# Patient Record
Sex: MALE | Race: WHITE | NOT HISPANIC OR LATINO
[De-identification: names, ages, dates, MRNs, and addresses within clinical notes are randomized per-mention and may not be internally consistent; named-entity substitution may affect disease eponyms.]

---

## 2019-03-26 ENCOUNTER — TRANSCRIPTION ENCOUNTER (OUTPATIENT)
Age: 74
End: 2019-03-26

## 2021-07-02 ENCOUNTER — TRANSCRIPTION ENCOUNTER (OUTPATIENT)
Age: 76
End: 2021-07-02

## 2023-10-16 ENCOUNTER — APPOINTMENT (OUTPATIENT)
Dept: UROLOGY | Facility: CLINIC | Age: 78
End: 2023-10-16
Payer: MEDICARE

## 2023-10-16 DIAGNOSIS — Z86.39 PERSONAL HISTORY OF OTHER ENDOCRINE, NUTRITIONAL AND METABOLIC DISEASE: ICD-10-CM

## 2023-10-16 DIAGNOSIS — Z87.891 PERSONAL HISTORY OF NICOTINE DEPENDENCE: ICD-10-CM

## 2023-10-16 DIAGNOSIS — Z78.9 OTHER SPECIFIED HEALTH STATUS: ICD-10-CM

## 2023-10-16 DIAGNOSIS — Z86.79 PERSONAL HISTORY OF OTHER DISEASES OF THE CIRCULATORY SYSTEM: ICD-10-CM

## 2023-10-16 PROCEDURE — 99203 OFFICE O/P NEW LOW 30 MIN: CPT

## 2023-10-20 ENCOUNTER — APPOINTMENT (OUTPATIENT)
Dept: MRI IMAGING | Facility: IMAGING CENTER | Age: 78
End: 2023-10-20
Payer: MEDICARE

## 2023-10-20 ENCOUNTER — RESULT REVIEW (OUTPATIENT)
Age: 78
End: 2023-10-20

## 2023-10-20 ENCOUNTER — OUTPATIENT (OUTPATIENT)
Dept: OUTPATIENT SERVICES | Facility: HOSPITAL | Age: 78
LOS: 1 days | End: 2023-10-20
Payer: MEDICARE

## 2023-10-20 DIAGNOSIS — M65.30 TRIGGER FINGER, UNSPECIFIED FINGER: Chronic | ICD-10-CM

## 2023-10-20 DIAGNOSIS — Q30.8 OTHER CONGENITAL MALFORMATIONS OF NOSE: Chronic | ICD-10-CM

## 2023-10-20 DIAGNOSIS — Z98.890 OTHER SPECIFIED POSTPROCEDURAL STATES: Chronic | ICD-10-CM

## 2023-10-20 DIAGNOSIS — R97.20 ELEVATED PROSTATE SPECIFIC ANTIGEN [PSA]: ICD-10-CM

## 2023-10-20 PROCEDURE — A9585: CPT

## 2023-10-20 PROCEDURE — 72197 MRI PELVIS W/O & W/DYE: CPT

## 2023-10-20 PROCEDURE — 76498P: CUSTOM | Mod: 26,MH

## 2023-10-20 PROCEDURE — 76498 UNLISTED MR PROCEDURE: CPT

## 2023-10-20 PROCEDURE — 72197 MRI PELVIS W/O & W/DYE: CPT | Mod: 26,MH

## 2024-01-11 ENCOUNTER — APPOINTMENT (OUTPATIENT)
Dept: SURGERY | Facility: CLINIC | Age: 79
End: 2024-01-11
Payer: MEDICARE

## 2024-01-11 VITALS
WEIGHT: 180 LBS | DIASTOLIC BLOOD PRESSURE: 79 MMHG | SYSTOLIC BLOOD PRESSURE: 153 MMHG | OXYGEN SATURATION: 98 % | BODY MASS INDEX: 24.38 KG/M2 | HEART RATE: 77 BPM | HEIGHT: 72 IN | TEMPERATURE: 97.2 F | RESPIRATION RATE: 17 BRPM

## 2024-01-11 DIAGNOSIS — D12.4 BENIGN NEOPLASM OF DESCENDING COLON: ICD-10-CM

## 2024-01-11 PROCEDURE — 99203 OFFICE O/P NEW LOW 30 MIN: CPT

## 2024-01-11 RX ORDER — ASPIRIN 81 MG
81 TABLET, DELAYED RELEASE (ENTERIC COATED) ORAL
Refills: 0 | Status: ACTIVE | COMMUNITY

## 2024-01-11 RX ORDER — IPRATROPIUM BROMIDE 21 UG/1
0.03 SPRAY NASAL
Refills: 0 | Status: ACTIVE | COMMUNITY

## 2024-01-11 RX ORDER — CETIRIZINE HYDROCHLORIDE 10 MG/1
CAPSULE, LIQUID FILLED ORAL
Refills: 0 | Status: ACTIVE | COMMUNITY

## 2024-01-11 RX ORDER — AMLODIPINE BESYLATE AND BENAZEPRIL HYDROCHLORIDE 10; 20 MG/1; MG/1
10-20 CAPSULE ORAL
Refills: 0 | Status: ACTIVE | COMMUNITY

## 2024-01-11 RX ORDER — ROSUVASTATIN CALCIUM 5 MG/1
TABLET, FILM COATED ORAL
Refills: 0 | Status: ACTIVE | COMMUNITY

## 2024-01-11 RX ORDER — VARDENAFIL 20 MG/1
20 TABLET, FILM COATED ORAL
Refills: 0 | Status: ACTIVE | COMMUNITY

## 2024-01-11 RX ORDER — EMPAGLIFLOZIN 25 MG/1
TABLET, FILM COATED ORAL
Refills: 0 | Status: ACTIVE | COMMUNITY

## 2024-01-11 RX ORDER — METFORMIN HYDROCHLORIDE 625 MG/1
TABLET ORAL
Refills: 0 | Status: ACTIVE | COMMUNITY

## 2024-01-11 RX ORDER — LEVOTHYROXINE SODIUM 0.17 MG/1
TABLET ORAL
Refills: 0 | Status: ACTIVE | COMMUNITY

## 2024-01-11 RX ORDER — METOPROLOL SUCCINATE 25 MG/1
25 TABLET, EXTENDED RELEASE ORAL
Refills: 0 | Status: ACTIVE | COMMUNITY

## 2024-01-11 NOTE — PHYSICAL EXAM
[Normal Breath Sounds] : Normal breath sounds [Normal Heart Sounds] : normal heart sounds [Alert] : alert [Oriented to Person] : oriented to person [Oriented to Place] : oriented to place [Oriented to Time] : oriented to time [Calm] : calm [Abdomen Masses] : No abdominal masses [Abdomen Tenderness] : ~T No ~M abdominal tenderness [de-identified] : WNL [de-identified] : WNL [de-identified] : RETAL [de-identified] : WNL ROM [de-identified] : WNL

## 2024-01-11 NOTE — HISTORY OF PRESENT ILLNESS
[FreeTextEntry1] : Jovan is a 77 y/o male here for a consultation visit, colon polyp.   Colonoscopy on 12/18/23 - Internal hemorrhoids. One 5 mm polyp in the rectum, removed with a cold snare. Resected and retrieved. One 5 mm polyp in the rectum, removed with a cold snare. Rescted and retrieved. One 12 mm polyp in the mid descending colon. Biopsied. Tattooed.  Pathology - 1. Descending colon, polyp, polypectomy: tubular adenoma. 2.  Descending colon, polyp 2, polypectomy: tubular adenoma. 3. Rectum, polyp, polypectomy: tubular adenoma.  Last seen more than 20 years ago, s/p transanal excision of polyp   Today pt reports no pain. Daily BMs, formed, denies pain, no bleeding, no episodes of incontinence, and denies feeling swollen or prolapsed tissue. Takes baby aspirin, hx of stents x5

## 2024-01-11 NOTE — CONSULT LETTER
[Dear  ___] : Dear ~CHRISTINE, [Courtesy Letter:] : I had the pleasure of seeing your patient, [unfilled], in my office today. [Please see my note below.] : Please see my note below. [Consult Closing:] : Thank you very much for allowing me to participate in the care of this patient.  If you have any questions, please do not hesitate to contact me. [Sincerely,] : Sincerely, [FreeTextEntry2] : Dr. Kirill Frederick [FreeTextEntry3] : Logan Pearce M.D., F.DESIRE.C.S., F.A.S.C.R.S. Chief Colorectal Clinical Services, [facility] [DrKwasi  ___] : Dr. FRIAS

## 2024-01-11 NOTE — ASSESSMENT
[FreeTextEntry1] : I have seen and evaluated patient and I have corroborated all nursing input into this note.  Patient with a flat polyp of the descending colon requiring endoscopic mucosal resection.  I will make arrangements for the procedure at [facility].  Indications, risks, benefits, alternatives reviewed including but not limited to bleeding, perforation, and inability to remove the polyp.  All questions were answered.

## 2024-01-17 DIAGNOSIS — Z12.11 ENCOUNTER FOR SCREENING FOR MALIGNANT NEOPLASM OF COLON: ICD-10-CM

## 2024-01-17 RX ORDER — SODIUM PICOSULFATE, MAGNESIUM OXIDE, AND ANHYDROUS CITRIC ACID 12; 3.5; 1 G/175ML; G/175ML; MG/175ML
10-3.5-12 MG-GM LIQUID ORAL
Qty: 1 | Refills: 0 | Status: ACTIVE | COMMUNITY
Start: 2024-01-17 | End: 1900-01-01

## 2024-02-05 ENCOUNTER — OUTPATIENT (OUTPATIENT)
Dept: OUTPATIENT SERVICES | Facility: HOSPITAL | Age: 79
LOS: 1 days | End: 2024-02-05
Payer: MEDICARE

## 2024-02-05 ENCOUNTER — APPOINTMENT (OUTPATIENT)
Dept: SURGERY | Facility: HOSPITAL | Age: 79
End: 2024-02-05
Payer: MEDICARE

## 2024-02-05 ENCOUNTER — TRANSCRIPTION ENCOUNTER (OUTPATIENT)
Age: 79
End: 2024-02-05

## 2024-02-05 ENCOUNTER — RESULT REVIEW (OUTPATIENT)
Age: 79
End: 2024-02-05

## 2024-02-05 VITALS
HEART RATE: 60 BPM | OXYGEN SATURATION: 99 % | DIASTOLIC BLOOD PRESSURE: 60 MMHG | SYSTOLIC BLOOD PRESSURE: 122 MMHG | RESPIRATION RATE: 17 BRPM

## 2024-02-05 VITALS
HEIGHT: 72 IN | OXYGEN SATURATION: 99 % | WEIGHT: 182.1 LBS | TEMPERATURE: 98 F | RESPIRATION RATE: 20 BRPM | DIASTOLIC BLOOD PRESSURE: 64 MMHG | SYSTOLIC BLOOD PRESSURE: 144 MMHG | HEART RATE: 69 BPM

## 2024-02-05 DIAGNOSIS — Q30.8 OTHER CONGENITAL MALFORMATIONS OF NOSE: Chronic | ICD-10-CM

## 2024-02-05 DIAGNOSIS — D12.4 BENIGN NEOPLASM OF DESCENDING COLON: ICD-10-CM

## 2024-02-05 DIAGNOSIS — Z98.890 OTHER SPECIFIED POSTPROCEDURAL STATES: Chronic | ICD-10-CM

## 2024-02-05 DIAGNOSIS — M65.30 TRIGGER FINGER, UNSPECIFIED FINGER: Chronic | ICD-10-CM

## 2024-02-05 LAB — GLUCOSE BLDC GLUCOMTR-MCNC: 114 MG/DL — HIGH (ref 70–99)

## 2024-02-05 PROCEDURE — 45380 COLONOSCOPY AND BIOPSY: CPT | Mod: XS

## 2024-02-05 PROCEDURE — 88305 TISSUE EXAM BY PATHOLOGIST: CPT | Mod: 26

## 2024-02-05 PROCEDURE — 45390 COLONOSCOPY W/RESECTION: CPT

## 2024-02-05 PROCEDURE — 45382 COLONOSCOPY W/CONTROL BLEED: CPT | Mod: 59

## 2024-02-05 PROCEDURE — 45381 COLONOSCOPY SUBMUCOUS NJX: CPT

## 2024-02-05 PROCEDURE — 45385 COLONOSCOPY W/LESION REMOVAL: CPT

## 2024-02-05 PROCEDURE — 88305 TISSUE EXAM BY PATHOLOGIST: CPT

## 2024-02-05 PROCEDURE — C1889: CPT

## 2024-02-05 PROCEDURE — 82962 GLUCOSE BLOOD TEST: CPT

## 2024-02-05 DEVICE — NET RETRV ROT ROTH 2.5MMX230CM: Type: IMPLANTABLE DEVICE | Status: FUNCTIONAL

## 2024-02-05 DEVICE — CLIP REPOSITIONABLE HEMOSTASIS 11MMX235CM: Type: IMPLANTABLE DEVICE | Status: FUNCTIONAL

## 2024-02-05 RX ORDER — AMLODIPINE BESYLATE AND BENAZEPRIL HYDROCHLORIDE 10; 20 MG/1; MG/1
1 CAPSULE ORAL
Refills: 0 | DISCHARGE

## 2024-02-05 RX ORDER — METOPROLOL TARTRATE 50 MG
1 TABLET ORAL
Refills: 0 | DISCHARGE

## 2024-02-05 RX ORDER — CETIRIZINE HYDROCHLORIDE 10 MG/1
1 TABLET ORAL
Refills: 0 | DISCHARGE

## 2024-02-05 RX ORDER — SODIUM CHLORIDE 9 MG/ML
500 INJECTION INTRAMUSCULAR; INTRAVENOUS; SUBCUTANEOUS
Refills: 0 | Status: DISCONTINUED | OUTPATIENT
Start: 2024-02-05 | End: 2024-02-19

## 2024-02-05 RX ORDER — EMPAGLIFLOZIN 10 MG/1
1 TABLET, FILM COATED ORAL
Refills: 0 | DISCHARGE

## 2024-02-05 NOTE — ASU PATIENT PROFILE, ADULT - NSICDXPASTSURGICALHX_GEN_ALL_CORE_FT
PAST SURGICAL HISTORY:  History of rectal polypectomy     Nose anomaly nasal cancer    Trigger finger reconstructed

## 2024-02-05 NOTE — ASU PATIENT PROFILE, ADULT - NSICDXPASTMEDICALHX_GEN_ALL_CORE_FT
PAST MEDICAL HISTORY:  Coronary artery disease     Diabetes type 2    Erectile dysfunction     Hyperlipidemia     Hypertension     Hypothyroidism     Seasonal allergic rhinitis

## 2024-02-05 NOTE — ASU DISCHARGE PLAN (ADULT/PEDIATRIC) - ASU DC SPECIAL INSTRUCTIONSFT
seizures Call Friday for pathology results.    Do not restart aspirin for 2 weeks if it is preventative.  If it is needed for stent or other indication restart tomorrow

## 2024-02-05 NOTE — PRE-ANESTHESIA EVALUATION ADULT - NSANTHOSAYNRD_GEN_A_CORE
No. AMBRELY screening performed.  STOP BANG Legend: 0-2 = LOW Risk; 3-4 = INTERMEDIATE Risk; 5-8 = HIGH Risk

## 2024-02-05 NOTE — PRE PROCEDURE NOTE - PRE PROCEDURE EVALUATION
Attending Physician:    Ramses                      Procedure:  colonsocopy    Indication for Procedure:  colon polyp  _____________________________________ ___________________  PAST MEDICAL & SURGICAL HISTORY:  Diabetes  type 2      Hypothyroidism      Hypertension      Hyperlipidemia      Erectile dysfunction      Coronary artery disease      Seasonal allergic rhinitis      Nose anomaly  nasal cancer      History of rectal polypectomy      Trigger finger  reconstructed        ALLERGIES:  No Known Allergies    HOME MEDICATIONS:  acetaminophen 325 mg oral tablet: 1 tab(s) orally every 4 hours, As needed, Mild Pain (1 - 3)  amlodipine-benazepril 10 mg-20 mg oral capsule: 1 cap(s) orally once a day  aspirin 81 mg oral delayed release tablet: 1 tab(s) orally once a day  ipratropium 21 mcg/inh (0.03%) nasal spray: 2 spray(s) nasal 3 times a day  Jardiance 10 mg oral tablet: 1 tab(s) orally once a day  Lipitor 10 mg oral tablet: 1 tab(s) orally once a day  Lotrel 10 mg-20 mg oral capsule: 1 cap(s) orally once a day  metFORMIN 500 mg oral tablet, extended release: 1 tab(s) orally once a day. DO NOT TAKE on 12/14 or 12/15, restart on 12/16.  metoprolol succinate 25 mg oral tablet, extended release: 1 tab(s) orally once a day  Metoprolol Succinate ER 25 mg oral tablet, extended release: 1 tab(s) orally once a day  Synthroid 125 mcg (0.125 mg) oral tablet: 1 tab(s) orally once a day  Viagra 100 mg oral tablet: 1 tab(s) orally once a day, As Needed  ZyrTEC 10 mg oral tablet: 1 tab(s) orally once a day    AICD/PPM: [ ] yes   [x ] no    PERTINENT LAB DATA:                      PHYSICAL EXAMINATION:    Height (cm): 182.9  Weight (kg): 82.599  BMI (kg/m2): 24.7  BSA (m2): 2.05T(C): 36.8  HR: 69  BP: 144/64  RR: 20  SpO2: 99%    Constitutional: NAD  HEENT: PERRLA, EOMI,    Neck:  No JVD  Respiratory: CTAB/L  Cardiovascular: S1 and S2  Gastrointestinal: BS+, soft, NT/ND  Extremities: No peripheral edema  Neurological: A/O x 3, no focal deficits  Psychiatric: Normal mood, normal affect  Skin: No rashes    ASA Class: I [ ]  II [ ]  III [ ]  IV [ ]  per anesthesia    COMMENTS:    The patient is a suitable candidate for the planned procedure unless box checked [ ]  No, explain:

## 2024-02-10 LAB — SURGICAL PATHOLOGY STUDY: SIGNIFICANT CHANGE UP

## 2024-02-12 ENCOUNTER — NON-APPOINTMENT (OUTPATIENT)
Age: 79
End: 2024-02-12

## 2024-04-25 ENCOUNTER — APPOINTMENT (OUTPATIENT)
Dept: UROLOGY | Facility: CLINIC | Age: 79
End: 2024-04-25
Payer: MEDICARE

## 2024-04-25 VITALS
HEIGHT: 72 IN | TEMPERATURE: 97.7 F | HEART RATE: 72 BPM | BODY MASS INDEX: 24.38 KG/M2 | WEIGHT: 180 LBS | OXYGEN SATURATION: 98 % | DIASTOLIC BLOOD PRESSURE: 78 MMHG | RESPIRATION RATE: 17 BRPM | SYSTOLIC BLOOD PRESSURE: 146 MMHG

## 2024-04-25 DIAGNOSIS — R97.20 ELEVATED PROSTATE, SPECIFIC ANTIGEN [PSA]: ICD-10-CM

## 2024-04-25 PROCEDURE — G2211 COMPLEX E/M VISIT ADD ON: CPT

## 2024-04-25 PROCEDURE — 99213 OFFICE O/P EST LOW 20 MIN: CPT

## 2024-04-25 NOTE — HISTORY OF PRESENT ILLNESS
[FreeTextEntry1] : He is a 78-year-old man who is seen today for elevated PSA level.  He does not have any new urological symptoms.  PSA level was 4.8 in April 2024.  MRI of the prostate in October 2023 showed an 82 g prostate and a PI-RADS 3 lesion.  He decided on continued monitoring. Previous notes: There is no weight loss or bone pain.  Nocturia is once or twice.  In October 2023 PSA level was 4.36.  In July 2023 PSA level was 4.7.  Patient states that for his back he underwent an MRI which showed some prostate nodularity or prominence.  MRI of the left hip from [facility] in June 2023 showed nodularity of the prostate and bladder trabeculation.

## 2024-04-25 NOTE — LETTER BODY
[Dear  ___] : Dear  [unfilled], [Consult Letter:] : I had the pleasure of evaluating your patient, [unfilled]. [Consult Closing:] : Thank you very much for allowing me to participate in the care of this patient.  If you have any questions, please do not hesitate to contact me. [FreeTextEntry1] : [id number], [place] [address]   [phone]

## 2024-04-25 NOTE — ASSESSMENT
[FreeTextEntry1] : Significance of the PI-RADS 3 lesion was discussed.  Given his age and stability of PSA plus lack of symptoms he would rather continue monitoring for now.  He will have PSA level done by his primary care physician every 6 months and follow-up with me in 1 year as long as there are no significant changes in PSA level.  Lester Peck MD, FACS [facility]  of Urology  [address], [address] [place] [address]  Tel: [phone] Fax: [phone]

## 2024-05-21 ENCOUNTER — OFFICE (OUTPATIENT)
Dept: URBAN - METROPOLITAN AREA CLINIC 112 | Facility: CLINIC | Age: 79
Setting detail: OPHTHALMOLOGY
End: 2024-05-21
Payer: MEDICARE

## 2024-05-21 DIAGNOSIS — H18.519: ICD-10-CM

## 2024-05-21 DIAGNOSIS — H25.13: ICD-10-CM

## 2024-05-21 DIAGNOSIS — H25.11: ICD-10-CM

## 2024-05-21 DIAGNOSIS — H35.033: ICD-10-CM

## 2024-05-21 DIAGNOSIS — H25.12: ICD-10-CM

## 2024-05-21 PROCEDURE — 92250 FUNDUS PHOTOGRAPHY W/I&R: CPT | Performed by: OPHTHALMOLOGY

## 2024-05-21 PROCEDURE — 99204 OFFICE O/P NEW MOD 45 MIN: CPT | Performed by: OPHTHALMOLOGY

## 2024-05-21 ASSESSMENT — CONFRONTATIONAL VISUAL FIELD TEST (CVF)
OS_FINDINGS: FULL
OD_FINDINGS: FULL

## 2024-06-07 ENCOUNTER — OFFICE (OUTPATIENT)
Dept: URBAN - METROPOLITAN AREA CLINIC 94 | Facility: CLINIC | Age: 79
Setting detail: OPHTHALMOLOGY
End: 2024-06-07
Payer: MEDICARE

## 2024-06-07 ENCOUNTER — RX ONLY (RX ONLY)
Age: 79
End: 2024-06-07

## 2024-06-07 DIAGNOSIS — H18.519: ICD-10-CM

## 2024-06-07 DIAGNOSIS — H35.033: ICD-10-CM

## 2024-06-07 DIAGNOSIS — H25.13: ICD-10-CM

## 2024-06-07 DIAGNOSIS — H35.373: ICD-10-CM

## 2024-06-07 DIAGNOSIS — H25.12: ICD-10-CM

## 2024-06-07 PROCEDURE — 99213 OFFICE O/P EST LOW 20 MIN: CPT | Performed by: OPHTHALMOLOGY

## 2024-06-07 PROCEDURE — 92136 OPHTHALMIC BIOMETRY: CPT | Mod: 26,LT | Performed by: OPHTHALMOLOGY

## 2024-06-07 PROCEDURE — 92136 OPHTHALMIC BIOMETRY: CPT | Mod: TC | Performed by: OPHTHALMOLOGY

## 2024-06-07 PROCEDURE — 92134 CPTRZ OPH DX IMG PST SGM RTA: CPT | Performed by: OPHTHALMOLOGY

## 2024-06-07 RX ORDER — PREDNISOLONE ACETATE 10 MG/ML
SUSPENSION/ DROPS OPHTHALMIC
Qty: 1 | Refills: 2 | Status: ACTIVE | OUTPATIENT

## 2024-06-07 RX ORDER — KETOROLAC TROMETHAMINE 5 MG/ML
SOLUTION/ DROPS OPHTHALMIC
Qty: 1 | Refills: 2 | Status: ACTIVE | OUTPATIENT

## 2024-06-07 RX ORDER — OFLOXACIN 3 MG/ML
SOLUTION OPHTHALMIC
Qty: 1 | Refills: 1 | Status: ACTIVE | OUTPATIENT

## 2024-06-07 ASSESSMENT — CONFRONTATIONAL VISUAL FIELD TEST (CVF)
OD_FINDINGS: FULL
OS_FINDINGS: FULL

## 2024-06-20 ENCOUNTER — ASC (OUTPATIENT)
Dept: URBAN - METROPOLITAN AREA SURGERY 8 | Facility: SURGERY | Age: 79
Setting detail: OPHTHALMOLOGY
End: 2024-06-20
Payer: MEDICARE

## 2024-06-20 DIAGNOSIS — H52.212: ICD-10-CM

## 2024-06-20 DIAGNOSIS — H25.12: ICD-10-CM

## 2024-06-20 PROCEDURE — 66984 XCAPSL CTRC RMVL W/O ECP: CPT | Mod: LT | Performed by: OPHTHALMOLOGY

## 2024-06-20 PROCEDURE — A9270 NON-COVERED ITEM OR SERVICE: HCPCS | Mod: GY | Performed by: OPHTHALMOLOGY

## 2024-06-20 PROCEDURE — 68841 INSJ RX ELUT IMPLT LAC CANAL: CPT | Mod: LT | Performed by: OPHTHALMOLOGY

## 2024-06-20 PROCEDURE — VIVITY VIVITY: Performed by: OPHTHALMOLOGY

## 2024-06-20 PROCEDURE — FEMTO FEMTOSECOND LASER: Mod: GY | Performed by: OPHTHALMOLOGY

## 2024-06-21 ENCOUNTER — RX ONLY (RX ONLY)
Age: 79
End: 2024-06-21

## 2024-06-21 ENCOUNTER — OFFICE (OUTPATIENT)
Dept: URBAN - METROPOLITAN AREA CLINIC 94 | Facility: CLINIC | Age: 79
Setting detail: OPHTHALMOLOGY
End: 2024-06-21
Payer: MEDICARE

## 2024-06-21 DIAGNOSIS — Z96.1: ICD-10-CM

## 2024-06-21 PROCEDURE — 99024 POSTOP FOLLOW-UP VISIT: CPT | Performed by: REGISTERED NURSE

## 2024-06-21 ASSESSMENT — CONFRONTATIONAL VISUAL FIELD TEST (CVF)
OD_FINDINGS: FULL
OS_FINDINGS: FULL

## 2024-06-25 ENCOUNTER — OFFICE (OUTPATIENT)
Dept: URBAN - METROPOLITAN AREA CLINIC 114 | Facility: CLINIC | Age: 79
Setting detail: OPHTHALMOLOGY
End: 2024-06-25
Payer: MEDICARE

## 2024-06-25 DIAGNOSIS — Z96.1: ICD-10-CM

## 2024-06-25 PROBLEM — H35.373 EPIRETINAL MEMBRANE; BOTH EYES: Status: ACTIVE | Noted: 2024-06-07

## 2024-06-25 PROBLEM — H16.223 DRY EYE SYNDROME K SICCA; BOTH EYES: Status: ACTIVE | Noted: 2024-06-21

## 2024-06-25 PROBLEM — H35.342 MACULAR HOLE OR PSEUDOHOLE; LEFT EYE: Status: ACTIVE | Noted: 2024-06-07

## 2024-06-25 PROCEDURE — 99024 POSTOP FOLLOW-UP VISIT: CPT | Performed by: OPHTHALMOLOGY

## 2024-06-25 ASSESSMENT — CONFRONTATIONAL VISUAL FIELD TEST (CVF)
OD_FINDINGS: FULL
OS_FINDINGS: FULL

## 2024-07-05 ENCOUNTER — OFFICE (OUTPATIENT)
Dept: URBAN - METROPOLITAN AREA CLINIC 94 | Facility: CLINIC | Age: 79
Setting detail: OPHTHALMOLOGY
End: 2024-07-05
Payer: MEDICARE

## 2024-07-05 ENCOUNTER — RX ONLY (RX ONLY)
Age: 79
End: 2024-07-05

## 2024-07-05 DIAGNOSIS — H25.11: ICD-10-CM

## 2024-07-05 DIAGNOSIS — Z96.1: ICD-10-CM

## 2024-07-05 DIAGNOSIS — H35.341: ICD-10-CM

## 2024-07-05 DIAGNOSIS — H26.492: ICD-10-CM

## 2024-07-05 DIAGNOSIS — H35.373: ICD-10-CM

## 2024-07-05 PROCEDURE — 99024 POSTOP FOLLOW-UP VISIT: CPT | Performed by: OPHTHALMOLOGY

## 2024-07-05 PROCEDURE — 92134 CPTRZ OPH DX IMG PST SGM RTA: CPT | Performed by: OPHTHALMOLOGY

## 2024-07-05 ASSESSMENT — CONFRONTATIONAL VISUAL FIELD TEST (CVF)
OS_FINDINGS: FULL
OD_FINDINGS: FULL

## 2024-07-23 ENCOUNTER — OFFICE (OUTPATIENT)
Dept: URBAN - METROPOLITAN AREA CLINIC 114 | Facility: CLINIC | Age: 79
Setting detail: OPHTHALMOLOGY
End: 2024-07-23
Payer: MEDICARE

## 2024-07-23 DIAGNOSIS — H25.11: ICD-10-CM

## 2024-07-23 DIAGNOSIS — H59.032: ICD-10-CM

## 2024-07-23 DIAGNOSIS — H26.492: ICD-10-CM

## 2024-07-23 DIAGNOSIS — Z96.1: ICD-10-CM

## 2024-07-23 DIAGNOSIS — H35.341: ICD-10-CM

## 2024-07-23 PROCEDURE — 92134 CPTRZ OPH DX IMG PST SGM RTA: CPT | Performed by: OPHTHALMOLOGY

## 2024-07-23 PROCEDURE — 99024 POSTOP FOLLOW-UP VISIT: CPT | Performed by: OPHTHALMOLOGY

## 2024-07-23 ASSESSMENT — CONFRONTATIONAL VISUAL FIELD TEST (CVF)
OD_FINDINGS: FULL
OS_FINDINGS: FULL

## 2024-08-09 ENCOUNTER — RX ONLY (RX ONLY)
Age: 79
End: 2024-08-09

## 2024-08-09 ENCOUNTER — OFFICE (OUTPATIENT)
Dept: URBAN - METROPOLITAN AREA CLINIC 94 | Facility: CLINIC | Age: 79
Setting detail: OPHTHALMOLOGY
End: 2024-08-09
Payer: MEDICARE

## 2024-08-09 DIAGNOSIS — Z96.1: ICD-10-CM

## 2024-08-09 DIAGNOSIS — H25.11: ICD-10-CM

## 2024-08-09 PROCEDURE — 99213 OFFICE O/P EST LOW 20 MIN: CPT | Performed by: OPHTHALMOLOGY

## 2024-08-09 ASSESSMENT — CONFRONTATIONAL VISUAL FIELD TEST (CVF)
OS_FINDINGS: FULL
OD_FINDINGS: FULL

## 2024-08-13 ENCOUNTER — RX ONLY (RX ONLY)
Age: 79
End: 2024-08-13

## 2024-09-20 ENCOUNTER — OFFICE (OUTPATIENT)
Dept: URBAN - METROPOLITAN AREA CLINIC 94 | Facility: CLINIC | Age: 79
Setting detail: OPHTHALMOLOGY
End: 2024-09-20
Payer: MEDICARE

## 2024-09-20 DIAGNOSIS — H26.492: ICD-10-CM

## 2024-09-20 DIAGNOSIS — H25.11: ICD-10-CM

## 2024-09-20 DIAGNOSIS — Z96.1: ICD-10-CM

## 2024-09-20 PROCEDURE — 92012 INTRM OPH EXAM EST PATIENT: CPT | Performed by: OPHTHALMOLOGY

## 2024-09-20 ASSESSMENT — CONFRONTATIONAL VISUAL FIELD TEST (CVF)
OS_FINDINGS: FULL
OD_FINDINGS: FULL

## 2024-10-25 ENCOUNTER — OFFICE (OUTPATIENT)
Dept: URBAN - METROPOLITAN AREA CLINIC 94 | Facility: CLINIC | Age: 79
Setting detail: OPHTHALMOLOGY
End: 2024-10-25
Payer: MEDICARE

## 2024-10-25 ENCOUNTER — ASC (OUTPATIENT)
Dept: URBAN - METROPOLITAN AREA SURGERY 8 | Facility: SURGERY | Age: 79
Setting detail: OPHTHALMOLOGY
End: 2024-10-25
Payer: MEDICARE

## 2024-10-25 DIAGNOSIS — H35.373: ICD-10-CM

## 2024-10-25 DIAGNOSIS — H25.11: ICD-10-CM

## 2024-10-25 DIAGNOSIS — H26.492: ICD-10-CM

## 2024-10-25 PROCEDURE — 92134 CPTRZ OPH DX IMG PST SGM RTA: CPT | Performed by: OPHTHALMOLOGY

## 2024-10-25 PROCEDURE — 66821 AFTER CATARACT LASER SURGERY: CPT | Mod: LT | Performed by: OPHTHALMOLOGY

## 2024-10-25 PROCEDURE — 99213 OFFICE O/P EST LOW 20 MIN: CPT | Mod: 57 | Performed by: OPHTHALMOLOGY

## 2024-10-25 ASSESSMENT — REFRACTION_MANIFEST
OD_AXIS: 107
OD_CYLINDER: -2.25
OD_SPHERE: -1.75
OS_CYLINDER: -0.75
OD_AXIS: 107
OS_CYLINDER: -1.50
OS_ADD: +2.25
OS_SPHERE: -3.25
OD_SPHERE: -2.00
OS_AXIS: 114
OS_AXIS: 071
OS_VA1: 20/25
OS_VA1: 20/40-
OS_CYLINDER: -0.75
OD_ADD: +2.25
OS_AXIS: 130
OS_SPHERE: 0.00
OS_SPHERE: +0.50
OS_VA2: 20/25(J1)
OD_VA1: 20/40
OD_CYLINDER: -2.50

## 2024-10-25 ASSESSMENT — KERATOMETRY
OD_K2POWER_DIOPTERS: 46.25
OS_AXISANGLE_DEGREES: 141
OD_AXISANGLE_DEGREES: 017
OS_K1POWER_DIOPTERS: 45.75
OD_K1POWER_DIOPTERS: 45.25
OS_K2POWER_DIOPTERS: 46.00

## 2024-10-25 ASSESSMENT — REFRACTION_CURRENTRX
OS_AXIS: 070
OD_SPHERE: -2.75
OS_SPHERE: -3.50
OD_ADD: +2.50
OD_CYLINDER: -1.00
OD_OVR_VA: 20/
OS_CYLINDER: -1.00
OD_AXIS: 110
OS_OVR_VA: 20/
OS_ADD: +2.50

## 2024-10-25 ASSESSMENT — REFRACTION_AUTOREFRACTION
OS_SPHERE: 0.00
OD_CYLINDER: -2.50
OD_SPHERE: -2.00
OS_CYLINDER: -0.25
OS_AXIS: 113
OD_AXIS: 115

## 2024-10-25 ASSESSMENT — VISUAL ACUITY
OS_BCVA: 20/40-1
OD_BCVA: 20/25

## 2024-11-13 ENCOUNTER — OFFICE (OUTPATIENT)
Dept: URBAN - METROPOLITAN AREA CLINIC 94 | Facility: CLINIC | Age: 79
Setting detail: OPHTHALMOLOGY
End: 2024-11-13
Payer: MEDICARE

## 2024-11-13 DIAGNOSIS — H35.373: ICD-10-CM

## 2024-11-13 DIAGNOSIS — H25.11: ICD-10-CM

## 2024-11-13 PROCEDURE — 92012 INTRM OPH EXAM EST PATIENT: CPT | Mod: 24 | Performed by: OPHTHALMOLOGY

## 2024-11-13 PROCEDURE — 92136 OPHTHALMIC BIOMETRY: CPT | Mod: 26,RT | Performed by: OPHTHALMOLOGY

## 2024-11-13 ASSESSMENT — KERATOMETRY
OD_K2POWER_DIOPTERS: 46.00
OS_AXISANGLE_DEGREES: 090
OS_K2POWER_DIOPTERS: 45.75
OS_K1POWER_DIOPTERS: 45.75
OD_AXISANGLE_DEGREES: 025
OD_K1POWER_DIOPTERS: 45.25

## 2024-11-13 ASSESSMENT — REFRACTION_MANIFEST
OS_SPHERE: -3.25
OS_AXIS: 071
OD_AXIS: 107
OS_CYLINDER: -1.50
OS_AXIS: 130
OD_ADD: +2.25
OS_SPHERE: 0.00
OS_CYLINDER: -0.75
OD_CYLINDER: -2.25
OS_VA1: 20/25
OD_SPHERE: -2.00
OS_CYLINDER: -0.75
OS_SPHERE: +0.50
OD_CYLINDER: -2.50
OD_SPHERE: -1.75
OS_VA1: 20/40-
OD_AXIS: 107
OS_AXIS: 114
OS_VA2: 20/25(J1)
OD_VA1: 20/40
OS_ADD: +2.25

## 2024-11-13 ASSESSMENT — REFRACTION_CURRENTRX
OD_CYLINDER: -1.00
OD_OVR_VA: 20/
OD_AXIS: 110
OD_ADD: +2.50
OS_SPHERE: -3.50
OS_AXIS: 070
OS_CYLINDER: -1.00
OS_OVR_VA: 20/
OS_ADD: +2.50
OD_SPHERE: -2.75

## 2024-11-13 ASSESSMENT — TONOMETRY
OS_IOP_MMHG: 12
OD_IOP_MMHG: 14

## 2024-11-13 ASSESSMENT — REFRACTION_AUTOREFRACTION
OD_CYLINDER: -2.25
OD_SPHERE: -2.00
OS_SPHERE: +0.25
OD_AXIS: 115
OS_AXIS: 125
OS_CYLINDER: -0.50

## 2024-11-13 ASSESSMENT — CORNEAL DYSTROPHY - POSTERIOR
OD_POSTERIORDYSTROPHY: GUTTATA
OS_POSTERIORDYSTROPHY: GUTTATA

## 2024-11-13 ASSESSMENT — CONFRONTATIONAL VISUAL FIELD TEST (CVF)
OD_FINDINGS: FULL
OS_FINDINGS: FULL

## 2024-11-13 ASSESSMENT — CORNEAL EDEMA CLINICAL DESCRIPTION: OS_CORNEALEDEMA: 1+

## 2024-11-13 ASSESSMENT — VISUAL ACUITY
OS_BCVA: 20/30-1
OD_BCVA: 20/20

## 2024-11-13 ASSESSMENT — SUPERFICIAL PUNCTATE KERATITIS (SPK)
OD_SPK: T
OS_SPK: 2+

## 2024-11-13 ASSESSMENT — CORNEAL EDEMA - MICROCYSTIC EPITHELIAL EDEMA (MCE): OS_MCE: 2+

## 2025-01-02 ENCOUNTER — OFFICE (OUTPATIENT)
Dept: URBAN - METROPOLITAN AREA CLINIC 94 | Facility: CLINIC | Age: 80
Setting detail: OPHTHALMOLOGY
End: 2025-01-02
Payer: MEDICARE

## 2025-01-02 DIAGNOSIS — H25.11: ICD-10-CM

## 2025-01-02 DIAGNOSIS — H35.373: ICD-10-CM

## 2025-01-02 PROCEDURE — 92136 OPHTHALMIC BIOMETRY: CPT | Mod: 26,RT | Performed by: OPHTHALMOLOGY

## 2025-01-02 PROCEDURE — 92134 CPTRZ OPH DX IMG PST SGM RTA: CPT | Performed by: OPHTHALMOLOGY

## 2025-01-02 ASSESSMENT — REFRACTION_MANIFEST
OS_VA1: 20/25
OS_AXIS: 114
OS_AXIS: 130
OS_CYLINDER: -0.75
OS_SPHERE: +0.50
OS_VA1: 20/40-
OD_SPHERE: -2.00
OD_CYLINDER: -2.50
OD_CYLINDER: -2.25
OS_ADD: +2.25
OD_VA1: 20/40
OS_CYLINDER: -0.75
OS_SPHERE: 0.00
OD_ADD: +2.25
OD_AXIS: 107
OD_SPHERE: -1.75
OD_AXIS: 107
OS_CYLINDER: -1.50
OS_AXIS: 071
OS_SPHERE: -3.25
OS_VA2: 20/25(J1)

## 2025-01-02 ASSESSMENT — REFRACTION_CURRENTRX
OD_SPHERE: -2.75
OS_ADD: +2.50
OD_AXIS: 110
OS_CYLINDER: -1.00
OD_ADD: +2.50
OD_CYLINDER: -1.00
OS_AXIS: 070
OS_SPHERE: -3.50
OS_OVR_VA: 20/
OD_OVR_VA: 20/

## 2025-01-02 ASSESSMENT — REFRACTION_AUTOREFRACTION
OD_SPHERE: -2.00
OS_CYLINDER: -0.50
OS_AXIS: 125
OD_AXIS: 115
OD_CYLINDER: -2.25
OS_SPHERE: +0.25

## 2025-01-02 ASSESSMENT — KERATOMETRY
OS_AXISANGLE_DEGREES: 180
OS_AXISANGLE_DEGREES: 090
OD_AXISANGLE_DEGREES: 115
OD_K1K2_AVERAGE: 45.625
OD_K1POWER_DIOPTERS: 45.25
OD_AXISANGLE_DEGREES: 025
OD_CYLPOWER_DEGREES: 0.75
OD_K1POWER_DIOPTERS: 45.25
OS_K2POWER_DIOPTERS: 45.75
OS_K1K2_AVERAGE: 45.75
OD_K2POWER_DIOPTERS: 46.00
OD_AXISANGLE2_DEGREES: 025
OS_K1POWER_DIOPTERS: 45.75
OS_K1POWER_DIOPTERS: 45.75
OD_K2POWER_DIOPTERS: 46.00
OS_AXISANGLE2_DEGREES: 090
OS_CYLAXISANGLE_DEGREES: 180
OD_CYLAXISANGLE_DEGREES: 025
OS_K2POWER_DIOPTERS: 45.75

## 2025-01-02 ASSESSMENT — VISUAL ACUITY
OD_BCVA: 20/20
OS_BCVA: 20/30-1

## 2025-01-25 ENCOUNTER — NON-APPOINTMENT (OUTPATIENT)
Age: 80
End: 2025-01-25

## 2025-01-29 ENCOUNTER — OUTPATIENT (OUTPATIENT)
Dept: OUTPATIENT SERVICES | Facility: HOSPITAL | Age: 80
LOS: 1 days | End: 2025-01-29
Payer: MEDICARE

## 2025-01-29 ENCOUNTER — APPOINTMENT (OUTPATIENT)
Dept: ULTRASOUND IMAGING | Facility: IMAGING CENTER | Age: 80
End: 2025-01-29
Payer: MEDICARE

## 2025-01-29 DIAGNOSIS — Q30.8 OTHER CONGENITAL MALFORMATIONS OF NOSE: Chronic | ICD-10-CM

## 2025-01-29 DIAGNOSIS — Z00.8 ENCOUNTER FOR OTHER GENERAL EXAMINATION: ICD-10-CM

## 2025-01-29 DIAGNOSIS — M65.30 TRIGGER FINGER, UNSPECIFIED FINGER: Chronic | ICD-10-CM

## 2025-01-29 DIAGNOSIS — Z98.890 OTHER SPECIFIED POSTPROCEDURAL STATES: Chronic | ICD-10-CM

## 2025-01-29 PROCEDURE — 76770 US EXAM ABDO BACK WALL COMP: CPT | Mod: 26

## 2025-01-29 PROCEDURE — 76770 US EXAM ABDO BACK WALL COMP: CPT

## 2025-02-06 ENCOUNTER — ASC (OUTPATIENT)
Dept: URBAN - METROPOLITAN AREA SURGERY 8 | Facility: SURGERY | Age: 80
Setting detail: OPHTHALMOLOGY
End: 2025-02-06
Payer: MEDICARE

## 2025-02-06 DIAGNOSIS — H25.11: ICD-10-CM

## 2025-02-06 DIAGNOSIS — H52.221: ICD-10-CM

## 2025-02-06 PROCEDURE — 68841 INSJ RX ELUT IMPLT LAC CANAL: CPT | Mod: RT | Performed by: OPHTHALMOLOGY

## 2025-02-06 PROCEDURE — FEMTO PRECISION LASER CATARACT SURGERY: Mod: GY | Performed by: OPHTHALMOLOGY

## 2025-02-06 PROCEDURE — V2788LAL LIGHT ADJUSTABLE LENS (LAL): Performed by: OPHTHALMOLOGY

## 2025-02-06 PROCEDURE — S9986 NOT MEDICALLY NECESSARY SVC: HCPCS | Mod: GX,GY | Performed by: OPHTHALMOLOGY

## 2025-02-06 PROCEDURE — 66984 XCAPSL CTRC RMVL W/O ECP: CPT | Mod: RT | Performed by: OPHTHALMOLOGY

## 2025-02-07 ENCOUNTER — RX ONLY (RX ONLY)
Age: 80
End: 2025-02-07

## 2025-02-07 ENCOUNTER — OFFICE (OUTPATIENT)
Dept: URBAN - METROPOLITAN AREA CLINIC 94 | Facility: CLINIC | Age: 80
Setting detail: OPHTHALMOLOGY
End: 2025-02-07
Payer: MEDICARE

## 2025-02-07 DIAGNOSIS — H26.492: ICD-10-CM

## 2025-02-07 DIAGNOSIS — H16.223: ICD-10-CM

## 2025-02-07 DIAGNOSIS — H35.373: ICD-10-CM

## 2025-02-07 DIAGNOSIS — Z96.1: ICD-10-CM

## 2025-02-07 PROCEDURE — 99024 POSTOP FOLLOW-UP VISIT: CPT | Performed by: OPHTHALMOLOGY

## 2025-02-07 ASSESSMENT — REFRACTION_AUTOREFRACTION
OD_AXIS: 042
OD_CYLINDER: -1.00
OS_SPHERE: +0.25
OS_AXIS: 127
OS_CYLINDER: -0.75
OD_SPHERE: -0.50

## 2025-02-07 ASSESSMENT — REFRACTION_MANIFEST
OS_SPHERE: 0.00
OS_SPHERE: -3.25
OD_AXIS: 107
OS_CYLINDER: -0.75
OS_AXIS: 071
OD_AXIS: 107
OS_AXIS: 114
OS_VA2: 20/25(J1)
OS_CYLINDER: -0.75
OS_AXIS: 130
OS_ADD: +2.25
OD_CYLINDER: -2.25
OD_VA1: 20/40
OS_SPHERE: +0.50
OS_CYLINDER: -1.50
OS_VA1: 20/40-
OD_SPHERE: -2.00
OD_ADD: +2.25
OD_CYLINDER: -2.50
OD_SPHERE: -1.75
OS_VA1: 20/25

## 2025-02-07 ASSESSMENT — REFRACTION_CURRENTRX
OD_AXIS: 110
OS_AXIS: 070
OD_OVR_VA: 20/
OS_OVR_VA: 20/
OS_ADD: +2.50
OD_ADD: +2.50
OS_SPHERE: -3.50
OS_CYLINDER: -1.00
OD_SPHERE: -2.75
OD_CYLINDER: -1.00

## 2025-02-07 ASSESSMENT — KERATOMETRY
OS_AXISANGLE_DEGREES: 151
OD_K2POWER_DIOPTERS: 46.00
OS_K2POWER_DIOPTERS: 46.00
OD_AXISANGLE_DEGREES: 140
OS_K1POWER_DIOPTERS: 45.50
OD_K1POWER_DIOPTERS: 44.50

## 2025-02-07 ASSESSMENT — TONOMETRY
OS_IOP_MMHG: 14
OD_IOP_MMHG: 18

## 2025-02-07 ASSESSMENT — VISUAL ACUITY
OD_BCVA: 20/30
OS_BCVA: 20/200

## 2025-02-07 ASSESSMENT — CONFRONTATIONAL VISUAL FIELD TEST (CVF)
OS_FINDINGS: FULL
OD_FINDINGS: FULL

## 2025-02-07 ASSESSMENT — CORNEAL EDEMA CLINICAL DESCRIPTION
OD_CORNEALEDEMA: 1+
OS_CORNEALEDEMA: 1+

## 2025-02-07 ASSESSMENT — SUPERFICIAL PUNCTATE KERATITIS (SPK)
OD_SPK: T
OS_SPK: 2+

## 2025-02-07 ASSESSMENT — CORNEAL EDEMA - MICROCYSTIC EPITHELIAL EDEMA (MCE): OS_MCE: 2+

## 2025-02-07 ASSESSMENT — CORNEAL DYSTROPHY - POSTERIOR
OS_POSTERIORDYSTROPHY: GUTTATA
OD_POSTERIORDYSTROPHY: GUTTATA

## 2025-02-11 ENCOUNTER — OFFICE (OUTPATIENT)
Dept: URBAN - METROPOLITAN AREA CLINIC 114 | Facility: CLINIC | Age: 80
Setting detail: OPHTHALMOLOGY
End: 2025-02-11
Payer: MEDICARE

## 2025-02-11 DIAGNOSIS — H35.373: ICD-10-CM

## 2025-02-11 DIAGNOSIS — Z96.1: ICD-10-CM

## 2025-02-11 DIAGNOSIS — H26.492: ICD-10-CM

## 2025-02-11 DIAGNOSIS — H16.223: ICD-10-CM

## 2025-02-11 PROCEDURE — 99024 POSTOP FOLLOW-UP VISIT: CPT | Performed by: OPHTHALMOLOGY

## 2025-02-11 ASSESSMENT — REFRACTION_MANIFEST
OS_ADD: +2.25
OS_CYLINDER: -1.50
OS_CYLINDER: -0.75
OS_SPHERE: -3.25
OS_AXIS: 130
OS_VA1: 20/40-
OD_CYLINDER: -2.25
OD_ADD: +2.25
OD_VA1: 20/40
OS_VA2: 20/25(J1)
OD_CYLINDER: -2.50
OS_VA1: 20/25
OS_AXIS: 114
OS_SPHERE: 0.00
OD_SPHERE: -2.00
OS_SPHERE: +0.50
OD_SPHERE: -1.75
OS_AXIS: 071
OD_AXIS: 107
OD_AXIS: 107
OS_CYLINDER: -0.75

## 2025-02-11 ASSESSMENT — KERATOMETRY
OD_AXISANGLE_DEGREES: 158
OD_K1POWER_DIOPTERS: 45.25
OS_K2POWER_DIOPTERS: 46.25
OS_K1POWER_DIOPTERS: 45.75
OD_K2POWER_DIOPTERS: 46.50
OS_AXISANGLE_DEGREES: 072

## 2025-02-11 ASSESSMENT — REFRACTION_CURRENTRX
OS_CYLINDER: -1.00
OD_OVR_VA: 20/
OS_OVR_VA: 20/
OD_SPHERE: -2.75
OD_CYLINDER: -1.00
OD_AXIS: 110
OS_AXIS: 070
OD_ADD: +2.50
OS_SPHERE: -3.50
OS_ADD: +2.50

## 2025-02-11 ASSESSMENT — REFRACTION_AUTOREFRACTION
OD_CYLINDER: -1.50
OS_AXIS: 127
OD_SPHERE: +0.75
OS_SPHERE: +0.25
OS_CYLINDER: -1.00
OD_AXIS: 078

## 2025-02-11 ASSESSMENT — VISUAL ACUITY
OD_BCVA: 20/20
OS_BCVA: 20/50

## 2025-02-11 ASSESSMENT — CONFRONTATIONAL VISUAL FIELD TEST (CVF)
OS_FINDINGS: FULL
OD_FINDINGS: FULL

## 2025-02-11 ASSESSMENT — CORNEAL DYSTROPHY - POSTERIOR
OD_POSTERIORDYSTROPHY: GUTTATA
OS_POSTERIORDYSTROPHY: GUTTATA

## 2025-02-11 ASSESSMENT — CORNEAL EDEMA - MICROCYSTIC EPITHELIAL EDEMA (MCE): OS_MCE: 2+

## 2025-02-11 ASSESSMENT — SUPERFICIAL PUNCTATE KERATITIS (SPK)
OD_SPK: T
OS_SPK: 2+

## 2025-02-11 ASSESSMENT — CORNEAL EDEMA CLINICAL DESCRIPTION
OD_CORNEALEDEMA: 1+
OS_CORNEALEDEMA: 1+

## 2025-02-25 ENCOUNTER — OFFICE (OUTPATIENT)
Dept: URBAN - METROPOLITAN AREA CLINIC 114 | Facility: CLINIC | Age: 80
Setting detail: OPHTHALMOLOGY
End: 2025-02-25
Payer: MEDICARE

## 2025-02-25 DIAGNOSIS — H35.373: ICD-10-CM

## 2025-02-25 DIAGNOSIS — H26.491: ICD-10-CM

## 2025-02-25 DIAGNOSIS — H16.223: ICD-10-CM

## 2025-02-25 PROCEDURE — 99024 POSTOP FOLLOW-UP VISIT: CPT | Performed by: OPHTHALMOLOGY

## 2025-02-25 ASSESSMENT — REFRACTION_CURRENTRX
OS_AXIS: 070
OD_SPHERE: -2.75
OS_SPHERE: -3.50
OS_ADD: +2.50
OD_CYLINDER: -1.00
OS_CYLINDER: -1.00
OD_AXIS: 110
OD_OVR_VA: 20/
OD_ADD: +2.50
OS_OVR_VA: 20/

## 2025-02-25 ASSESSMENT — REFRACTION_MANIFEST
OS_VA1: 20/20-1
OD_AXIS: 107
OD_VA1: 20/30
OS_CYLINDER: -1.50
OS_SPHERE: PLANO
OS_CYLINDER: -0.75
OS_ADD: +2.25
OD_AXIS: 074
OS_AXIS: 114
OS_CYLINDER: -0.75
OD_SPHERE: +0.50
OS_VA1: 20/25
OD_VA1: 20/40
OS_SPHERE: 0.00
OS_CYLINDER: -0.25
OS_AXIS: 071
OS_AXIS: 096
OD_CYLINDER: -0.75
OD_SPHERE: -2.00
OS_VA1: 20/40-
OD_AXIS: 107
OS_AXIS: 130
OS_VA2: 20/25(J1)
OD_CYLINDER: -2.25
OS_SPHERE: +0.50
OS_SPHERE: -3.25
OD_CYLINDER: -2.50
OU_VA: 20-1
OD_SPHERE: -1.75
OD_ADD: +2.25

## 2025-02-25 ASSESSMENT — SUPERFICIAL PUNCTATE KERATITIS (SPK)
OD_SPK: T
OS_SPK: 2+

## 2025-02-25 ASSESSMENT — REFRACTION_AUTOREFRACTION
OS_SPHERE: -0.25
OS_AXIS: 096
OD_CYLINDER: -1.50
OD_SPHERE: +0.50
OD_AXIS: 074
OS_CYLINDER: -0.50

## 2025-02-25 ASSESSMENT — CORNEAL EDEMA CLINICAL DESCRIPTION
OS_CORNEALEDEMA: 1+
OD_CORNEALEDEMA: 1+

## 2025-02-25 ASSESSMENT — VISUAL ACUITY
OD_BCVA: 20/25-1
OS_BCVA: 20/50

## 2025-02-25 ASSESSMENT — KERATOMETRY
OS_AXISANGLE_DEGREES: 154
OD_K1POWER_DIOPTERS: 45.50
OD_AXISANGLE_DEGREES: 154
OD_K2POWER_DIOPTERS: 46.50
OS_K2POWER_DIOPTERS: 46.50
OS_K1POWER_DIOPTERS: 45.75

## 2025-02-25 ASSESSMENT — CONFRONTATIONAL VISUAL FIELD TEST (CVF)
OD_FINDINGS: FULL
OS_FINDINGS: FULL

## 2025-02-25 ASSESSMENT — CORNEAL EDEMA - MICROCYSTIC EPITHELIAL EDEMA (MCE): OS_MCE: 2+

## 2025-02-25 ASSESSMENT — CORNEAL DYSTROPHY - POSTERIOR
OD_POSTERIORDYSTROPHY: GUTTATA
OS_POSTERIORDYSTROPHY: GUTTATA

## 2025-02-26 ENCOUNTER — ASC (OUTPATIENT)
Dept: URBAN - METROPOLITAN AREA SURGERY 8 | Facility: SURGERY | Age: 80
Setting detail: OPHTHALMOLOGY
End: 2025-02-26
Payer: MEDICARE

## 2025-02-26 DIAGNOSIS — H26.491: ICD-10-CM

## 2025-02-26 PROCEDURE — 66821 AFTER CATARACT LASER SURGERY: CPT | Mod: 79,RT | Performed by: OPHTHALMOLOGY

## 2025-02-26 ASSESSMENT — REFRACTION_AUTOREFRACTION
OD_SPHERE: +0.50
OS_SPHERE: -0.25
OD_CYLINDER: -1.50
OD_AXIS: 074
OS_CYLINDER: -0.50
OS_AXIS: 096

## 2025-02-26 ASSESSMENT — VISUAL ACUITY
OS_BCVA: 20/50
OD_BCVA: 20/25-1

## 2025-02-26 ASSESSMENT — CORNEAL EDEMA - MICROCYSTIC EPITHELIAL EDEMA (MCE): OS_MCE: 2+

## 2025-02-26 ASSESSMENT — KERATOMETRY
OD_K2POWER_DIOPTERS: 46.50
OD_AXISANGLE_DEGREES: 154
OS_K2POWER_DIOPTERS: 46.50
OS_AXISANGLE_DEGREES: 154
OD_K1POWER_DIOPTERS: 45.50
OS_K1POWER_DIOPTERS: 45.75

## 2025-02-26 ASSESSMENT — REFRACTION_MANIFEST
OD_SPHERE: +0.50
OD_CYLINDER: -0.75
OS_AXIS: 071
OU_VA: 20-1
OS_VA2: 20/25(J1)
OD_VA1: 20/40
OD_AXIS: 107
OS_SPHERE: 0.00
OS_CYLINDER: -1.50
OS_VA1: 20/20-1
OS_CYLINDER: -0.75
OS_VA1: 20/25
OD_SPHERE: -1.75
OD_ADD: +2.25
OS_SPHERE: +0.50
OS_CYLINDER: -0.25
OS_AXIS: 130
OS_VA1: 20/40-
OS_ADD: +2.25
OS_AXIS: 096
OD_VA1: 20/30
OD_CYLINDER: -2.25
OD_SPHERE: -2.00
OD_AXIS: 074
OD_CYLINDER: -2.50
OS_AXIS: 114
OS_SPHERE: -3.25
OS_CYLINDER: -0.75
OS_SPHERE: PLANO
OD_AXIS: 107

## 2025-02-26 ASSESSMENT — REFRACTION_CURRENTRX
OD_ADD: +2.50
OS_ADD: +2.50
OD_OVR_VA: 20/
OS_OVR_VA: 20/
OD_AXIS: 110
OS_SPHERE: -3.50
OD_CYLINDER: -1.00
OD_SPHERE: -2.75
OS_CYLINDER: -1.00
OS_AXIS: 070

## 2025-02-26 ASSESSMENT — CORNEAL DYSTROPHY - POSTERIOR
OS_POSTERIORDYSTROPHY: GUTTATA
OD_POSTERIORDYSTROPHY: GUTTATA

## 2025-02-26 ASSESSMENT — SUPERFICIAL PUNCTATE KERATITIS (SPK)
OS_SPK: 2+
OD_SPK: T

## 2025-02-26 ASSESSMENT — CONFRONTATIONAL VISUAL FIELD TEST (CVF)
OD_FINDINGS: FULL
OS_FINDINGS: FULL

## 2025-02-26 ASSESSMENT — CORNEAL EDEMA CLINICAL DESCRIPTION
OS_CORNEALEDEMA: 1+
OD_CORNEALEDEMA: 1+

## 2025-03-07 ENCOUNTER — OFFICE (OUTPATIENT)
Dept: URBAN - METROPOLITAN AREA CLINIC 94 | Facility: CLINIC | Age: 80
Setting detail: OPHTHALMOLOGY
End: 2025-03-07
Payer: MEDICARE

## 2025-03-07 ENCOUNTER — RX ONLY (RX ONLY)
Age: 80
End: 2025-03-07

## 2025-03-07 DIAGNOSIS — H35.373: ICD-10-CM

## 2025-03-07 DIAGNOSIS — H16.223: ICD-10-CM

## 2025-03-07 PROCEDURE — 99024 POSTOP FOLLOW-UP VISIT: CPT | Performed by: OPHTHALMOLOGY

## 2025-03-07 ASSESSMENT — REFRACTION_CURRENTRX
OS_OVR_VA: 20/
OD_AXIS: 110
OS_AXIS: 070
OD_ADD: +2.50
OS_CYLINDER: -1.00
OS_SPHERE: -3.50
OD_OVR_VA: 20/
OD_SPHERE: -2.75
OS_ADD: +2.50
OD_CYLINDER: -1.00

## 2025-03-07 ASSESSMENT — SUPERFICIAL PUNCTATE KERATITIS (SPK)
OS_SPK: 2+
OD_SPK: T

## 2025-03-07 ASSESSMENT — CONFRONTATIONAL VISUAL FIELD TEST (CVF)
OS_FINDINGS: FULL
OD_FINDINGS: FULL

## 2025-03-07 ASSESSMENT — REFRACTION_AUTOREFRACTION
OD_SPHERE: +0.50
OS_AXIS: 114
OS_CYLINDER: -0.50
OD_CYLINDER: -1.50
OS_SPHERE: +0.25
OD_AXIS: 088

## 2025-03-07 ASSESSMENT — REFRACTION_MANIFEST
OD_VA1: 20/30
OS_SPHERE: -3.25
OS_VA1: 20/25
OS_AXIS: 071
OS_CYLINDER: -0.25
OD_SPHERE: +0.50
OS_AXIS: 114
OS_CYLINDER: -0.75
OS_SPHERE: +0.50
OS_CYLINDER: -0.75
OD_AXIS: 107
OS_AXIS: 130
OS_SPHERE: PLANO
OS_CYLINDER: -1.50
OD_CYLINDER: -0.75
OS_VA1: 20/40-
OD_AXIS: 074
OS_ADD: +2.25
OD_CYLINDER: -2.50
OU_VA: 20-1
OD_AXIS: 107
OS_AXIS: 096
OD_VA1: 20/40
OD_SPHERE: -2.00
OD_SPHERE: -1.75
OD_ADD: +2.25
OS_VA2: 20/25(J1)
OD_CYLINDER: -2.25
OS_SPHERE: 0.00
OS_VA1: 20/20-1

## 2025-03-07 ASSESSMENT — CORNEAL EDEMA CLINICAL DESCRIPTION
OD_CORNEALEDEMA: 1+
OS_CORNEALEDEMA: 1+

## 2025-03-07 ASSESSMENT — KERATOMETRY
OD_K1POWER_DIOPTERS: 45.00
OD_K2POWER_DIOPTERS: 46.00
OS_K1POWER_DIOPTERS: 45.50
OS_AXISANGLE_DEGREES: 069
OD_AXISANGLE_DEGREES: 069
OS_K2POWER_DIOPTERS: 46.00

## 2025-03-07 ASSESSMENT — VISUAL ACUITY
OD_BCVA: 20/25+1
OS_BCVA: 20/25-1

## 2025-03-07 ASSESSMENT — CORNEAL EDEMA - MICROCYSTIC EPITHELIAL EDEMA (MCE): OS_MCE: 2+

## 2025-03-07 ASSESSMENT — TONOMETRY
OD_IOP_MMHG: 13
OS_IOP_MMHG: 14

## 2025-03-07 ASSESSMENT — CORNEAL DYSTROPHY - POSTERIOR
OS_POSTERIORDYSTROPHY: GUTTATA
OD_POSTERIORDYSTROPHY: GUTTATA

## 2025-04-19 ENCOUNTER — NON-APPOINTMENT (OUTPATIENT)
Age: 80
End: 2025-04-19

## 2025-04-21 ENCOUNTER — APPOINTMENT (OUTPATIENT)
Dept: UROLOGY | Facility: CLINIC | Age: 80
End: 2025-04-21
Payer: MEDICARE

## 2025-04-21 VITALS
BODY MASS INDEX: 24.38 KG/M2 | HEIGHT: 72 IN | OXYGEN SATURATION: 98 % | SYSTOLIC BLOOD PRESSURE: 117 MMHG | WEIGHT: 180 LBS | TEMPERATURE: 98 F | HEART RATE: 76 BPM | DIASTOLIC BLOOD PRESSURE: 68 MMHG | RESPIRATION RATE: 17 BRPM

## 2025-04-21 DIAGNOSIS — R35.1 BENIGN PROSTATIC HYPERPLASIA WITH LOWER URINARY TRACT SYMPMS: ICD-10-CM

## 2025-04-21 DIAGNOSIS — N40.1 BENIGN PROSTATIC HYPERPLASIA WITH LOWER URINARY TRACT SYMPMS: ICD-10-CM

## 2025-04-21 DIAGNOSIS — R97.20 ELEVATED PROSTATE, SPECIFIC ANTIGEN [PSA]: ICD-10-CM

## 2025-04-21 PROCEDURE — 51736 URINE FLOW MEASUREMENT: CPT

## 2025-04-21 PROCEDURE — 99214 OFFICE O/P EST MOD 30 MIN: CPT

## 2025-04-21 PROCEDURE — G2211 COMPLEX E/M VISIT ADD ON: CPT

## 2025-04-21 RX ORDER — TAMSULOSIN HYDROCHLORIDE 0.4 MG/1
0.4 CAPSULE ORAL
Qty: 90 | Refills: 2 | Status: ACTIVE | COMMUNITY
Start: 2025-04-21 | End: 1900-01-01

## 2025-07-01 ENCOUNTER — APPOINTMENT (OUTPATIENT)
Dept: NEPHROLOGY | Facility: CLINIC | Age: 80
End: 2025-07-01
Payer: MEDICARE

## 2025-07-01 VITALS — DIASTOLIC BLOOD PRESSURE: 60 MMHG | SYSTOLIC BLOOD PRESSURE: 130 MMHG

## 2025-07-01 VITALS
SYSTOLIC BLOOD PRESSURE: 140 MMHG | HEART RATE: 64 BPM | BODY MASS INDEX: 23.73 KG/M2 | WEIGHT: 175 LBS | RESPIRATION RATE: 14 BRPM | DIASTOLIC BLOOD PRESSURE: 70 MMHG

## 2025-07-01 PROBLEM — I25.118 CORONARY ARTERY DISEASE OF NATIVE ARTERY OF NATIVE HEART WITH STABLE ANGINA PECTORIS: Status: ACTIVE | Noted: 2025-07-01

## 2025-07-01 PROBLEM — I25.10 CAD, MULTIPLE VESSEL: Status: RESOLVED | Noted: 2025-07-01 | Resolved: 2025-07-01

## 2025-07-01 PROBLEM — R80.1 PERSISTENT PROTEINURIA: Status: ACTIVE | Noted: 2025-07-01

## 2025-07-01 PROBLEM — N18.30 STAGE 3 CHRONIC KIDNEY DISEASE, UNSPECIFIED WHETHER STAGE 3A OR 3B CKD: Status: ACTIVE | Noted: 2025-07-01

## 2025-07-01 PROBLEM — I10 ESSENTIAL (PRIMARY) HYPERTENSION: Status: ACTIVE | Noted: 2025-07-01

## 2025-07-01 PROCEDURE — G2211 COMPLEX E/M VISIT ADD ON: CPT

## 2025-07-01 PROCEDURE — 99204 OFFICE O/P NEW MOD 45 MIN: CPT

## 2025-07-01 RX ORDER — AMLODIPINE AND OLMESARTAN MEDOXOMIL 5; 20 MG/1; MG/1
5-20 TABLET ORAL
Refills: 0 | Status: ACTIVE | COMMUNITY

## 2025-07-01 RX ORDER — METFORMIN HYDROCHLORIDE 500 MG/1
500 TABLET, EXTENDED RELEASE ORAL AT BEDTIME
Refills: 0 | Status: ACTIVE | COMMUNITY
Start: 2025-07-01

## 2025-08-15 ENCOUNTER — RX ONLY (RX ONLY)
Age: 80
End: 2025-08-15

## 2025-08-15 ENCOUNTER — APPOINTMENT (OUTPATIENT)
Dept: NEPHROLOGY | Facility: CLINIC | Age: 80
End: 2025-08-15

## 2025-08-15 ENCOUNTER — OFFICE (OUTPATIENT)
Dept: URBAN - METROPOLITAN AREA CLINIC 94 | Facility: CLINIC | Age: 80
Setting detail: OPHTHALMOLOGY
End: 2025-08-15
Payer: MEDICARE

## 2025-08-15 DIAGNOSIS — H35.373: ICD-10-CM

## 2025-08-15 DIAGNOSIS — H16.223: ICD-10-CM

## 2025-08-15 PROCEDURE — 99213 OFFICE O/P EST LOW 20 MIN: CPT | Performed by: OPHTHALMOLOGY

## 2025-08-15 PROCEDURE — 92134 CPTRZ OPH DX IMG PST SGM RTA: CPT | Performed by: OPHTHALMOLOGY

## 2025-08-15 ASSESSMENT — KERATOMETRY
OS_K1POWER_DIOPTERS: 45.75
OD_K1POWER_DIOPTERS: 45.25
OS_K2POWER_DIOPTERS: 46.25
OD_K2POWER_DIOPTERS: 45.75
OS_AXISANGLE_DEGREES: 105
OD_AXISANGLE_DEGREES: 165

## 2025-08-15 ASSESSMENT — VISUAL ACUITY
OD_BCVA: 20/25
OS_BCVA: 20/25

## 2025-08-15 ASSESSMENT — CORNEAL EDEMA CLINICAL DESCRIPTION
OS_CORNEALEDEMA: 1+
OD_CORNEALEDEMA: 1+

## 2025-08-15 ASSESSMENT — REFRACTION_CURRENTRX
OD_ADD: +2.50
OS_CYLINDER: -1.00
OD_CYLINDER: -1.00
OS_AXIS: 070
OD_OVR_VA: 20/
OS_OVR_VA: 20/
OS_SPHERE: -3.50
OS_ADD: +2.50
OD_AXIS: 110
OD_SPHERE: -2.75

## 2025-08-15 ASSESSMENT — REFRACTION_MANIFEST
OD_VA1: 20/30
OD_ADD: +2.25
OS_AXIS: 071
OD_VA1: 20/40
OD_SPHERE: -2.00
OS_SPHERE: 0.00
OS_CYLINDER: -0.75
OD_SPHERE: +0.50
OS_AXIS: 114
OS_VA1: 20/20-1
OS_CYLINDER: -1.50
OS_AXIS: 096
OD_CYLINDER: -2.50
OD_AXIS: 074
OS_CYLINDER: -0.75
OD_CYLINDER: -0.75
OD_AXIS: 107
OD_AXIS: 107
OS_VA1: 20/40-
OS_CYLINDER: -0.25
OS_AXIS: 130
OS_SPHERE: -3.25
OS_VA1: 20/25
OS_VA2: 20/25(J1)
OU_VA: 20-1
OD_CYLINDER: -2.25
OS_SPHERE: +0.50
OD_SPHERE: -1.75
OS_SPHERE: PLANO
OS_ADD: +2.25

## 2025-08-15 ASSESSMENT — CONFRONTATIONAL VISUAL FIELD TEST (CVF)
OS_FINDINGS: FULL
OD_FINDINGS: FULL

## 2025-08-15 ASSESSMENT — REFRACTION_AUTOREFRACTION
OS_SPHERE: -0.75
OD_SPHERE: -0.25
OD_AXIS: 165
OS_CYLINDER: -0.25
OS_AXIS: 165
OD_CYLINDER: -0.25

## 2025-08-15 ASSESSMENT — CORNEAL DYSTROPHY - POSTERIOR
OD_POSTERIORDYSTROPHY: GUTTATA
OS_POSTERIORDYSTROPHY: GUTTATA

## 2025-08-15 ASSESSMENT — TONOMETRY
OS_IOP_MMHG: 18
OD_IOP_MMHG: 19

## 2025-08-15 ASSESSMENT — CORNEAL EDEMA - MICROCYSTIC EPITHELIAL EDEMA (MCE): OS_MCE: 2+

## 2025-08-15 ASSESSMENT — SUPERFICIAL PUNCTATE KERATITIS (SPK)
OS_SPK: 2+
OD_SPK: T

## 2025-09-17 ENCOUNTER — TRANSCRIPTION ENCOUNTER (OUTPATIENT)
Age: 80
End: 2025-09-17

## 2025-09-17 ENCOUNTER — RESULT REVIEW (OUTPATIENT)
Age: 80
End: 2025-09-17

## (undated) DEVICE — CATH IV SAFE BC 22G X 1" (BLUE)

## (undated) DEVICE — CATH IV SAFE BC 20G X 1.16" (PINK)

## (undated) DEVICE — ELCTR GROUNDING PAD ADULT COVIDIEN

## (undated) DEVICE — TUBING IV SET GRAVITY 3Y 100" MACRO

## (undated) DEVICE — SYR LUER LOK 50CC

## (undated) DEVICE — FORCEP RADIAL JAW 4 JUMBO 2.8MM 3.2MM 240CM ORANGE DISP

## (undated) DEVICE — SENSOR O2 FINGER ADULT

## (undated) DEVICE — PACK IV START WITH CHG

## (undated) DEVICE — BIOPSY FORCEP RADIAL JAW 4 STANDARD WITH NEEDLE

## (undated) DEVICE — BRUSH COLONOSCOPY CYTOLOGY

## (undated) DEVICE — SNARE CAPTIVATOR II 20MM

## (undated) DEVICE — IRRIGATOR BIO SHIELD

## (undated) DEVICE — CLAMP BX HOT RAD JAW 3

## (undated) DEVICE — FOLEY HOLDER STATLOCK 2 WAY ADULT

## (undated) DEVICE — TUBING SUCTION 20FT

## (undated) DEVICE — SOL INJ NS 0.9% 500ML 2 PORT

## (undated) DEVICE — POLY TRAP ETRAP

## (undated) DEVICE — TUBING SUCTION CONN 6FT STERILE

## (undated) DEVICE — LIFTER SYR EVERLIFT AGENT SUBMUCOSAL 10ML BLU

## (undated) DEVICE — Device

## (undated) DEVICE — SUCTION YANKAUER NO CONTROL VENT

## (undated) DEVICE — ENDOCUFF VISION SZ 3 SM PRPL